# Patient Record
(demographics unavailable — no encounter records)

---

## 2025-02-06 NOTE — PLAN
[FreeTextEntry1] : annual-pap mammogram colonoscopy brca panel done reviewed risk of ocp with age and elevated lipids plans to do mirena after passover

## 2025-02-06 NOTE — HISTORY OF PRESENT ILLNESS
[FreeTextEntry1] : 46 yo [id number] meds-seasonique,macrobid-suppressive day she has sex and the next, valtrex prn last mammo 11/23 has not done colonoscopy fh-mother breast ca in her 50's patient did brca testing in past but thinks only brca 1/2 not expanded panel lipids-borderline, not on meds last pap 7/23-ascus neg hpv here for annual, pap, renewal of rx nonsmoker